# Patient Record
Sex: FEMALE | Race: WHITE
[De-identification: names, ages, dates, MRNs, and addresses within clinical notes are randomized per-mention and may not be internally consistent; named-entity substitution may affect disease eponyms.]

---

## 2018-06-01 ENCOUNTER — HOSPITAL ENCOUNTER (EMERGENCY)
Dept: HOSPITAL 47 - EC | Age: 43
Discharge: HOME | End: 2018-06-01
Payer: COMMERCIAL

## 2018-06-01 VITALS — RESPIRATION RATE: 16 BRPM | SYSTOLIC BLOOD PRESSURE: 126 MMHG | DIASTOLIC BLOOD PRESSURE: 78 MMHG

## 2018-06-01 VITALS — HEART RATE: 90 BPM | TEMPERATURE: 98 F

## 2018-06-01 DIAGNOSIS — T24.292A: ICD-10-CM

## 2018-06-01 DIAGNOSIS — Z87.891: ICD-10-CM

## 2018-06-01 DIAGNOSIS — Z88.5: ICD-10-CM

## 2018-06-01 DIAGNOSIS — Z88.8: ICD-10-CM

## 2018-06-01 DIAGNOSIS — T24.322A: Primary | ICD-10-CM

## 2018-06-01 DIAGNOSIS — Z23: ICD-10-CM

## 2018-06-01 DIAGNOSIS — Z88.0: ICD-10-CM
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
## 2018-06-01 PROCEDURE — 16020 DRESS/DEBRID P-THICK BURN S: CPT

## 2018-06-01 PROCEDURE — 90715 TDAP VACCINE 7 YRS/> IM: CPT

## 2018-06-01 PROCEDURE — 99283 EMERGENCY DEPT VISIT LOW MDM: CPT

## 2018-06-01 PROCEDURE — 90471 IMMUNIZATION ADMIN: CPT
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
## 2021-04-29 ENCOUNTER — HOSPITAL ENCOUNTER (EMERGENCY)
Dept: HOSPITAL 47 - EC | Age: 46
Discharge: HOME | End: 2021-04-29
Payer: COMMERCIAL

## 2021-04-29 VITALS — DIASTOLIC BLOOD PRESSURE: 74 MMHG | HEART RATE: 91 BPM | SYSTOLIC BLOOD PRESSURE: 109 MMHG

## 2021-04-29 VITALS — RESPIRATION RATE: 18 BRPM | TEMPERATURE: 98.6 F

## 2021-04-29 DIAGNOSIS — U07.1: Primary | ICD-10-CM

## 2021-04-29 DIAGNOSIS — F32.9: ICD-10-CM

## 2021-04-29 PROCEDURE — 99284 EMERGENCY DEPT VISIT MOD MDM: CPT

## 2021-04-29 PROCEDURE — 71046 X-RAY EXAM CHEST 2 VIEWS: CPT

## 2021-04-29 NOTE — ED
General Adult HPI





- General


Chief complaint: Shortness of Breath


Stated complaint: cough/SOB


Time Seen by Provider: 04/29/21 09:36


Source: patient, RN notes reviewed


Mode of arrival: ambulatory


Limitations: no limitations





- History of Present Illness


Initial comments: 


This a 45-year-old female presents emergency Department chief complaint of 

shortness of breath, COVID-19.  Patient states that she's been sick for 8-9 days

tested positive on Tuesday at [facility].  Patient states that symptoms are not

improving.  She's been taken Motrin for fever.  She denies any significant past 

no history denies history of being immunocompromised, lung disease.  Patient has

leg pain leg swelling no chest pain patient states she's had on-and-off fevers 

bodyaches mild congestion.








- Related Data


                                Home Medications











 Medication  Instructions  Recorded  Confirmed


 


Ibuprofen [Motrin Ib] 600 - 800 mg PO Q6H PRN 06/01/18 06/01/18








                                  Previous Rx's











 Medication  Instructions  Recorded


 


Cephalexin [Keflex] 500 mg PO Q6HR #40 cap 06/01/18


 


Ibuprofen [Motrin] 600 mg PO Q8HR PRN #30 tab 06/01/18


 


SILVER sulfADIAZINE Cream 1 applic TOPICAL BID #30 gram 06/01/18





[Silvadene 1% Cream]  


 


Dexamethasone [Decadron] 6 mg PO DAILY #5 tablet 04/29/21











                                    Allergies











Allergy/AdvReac Type Severity Reaction Status Date / Time


 


adhesive tape Allergy  Rash/Hives Verified 04/29/21 09:35


 


bacitracin Allergy  Rash/Hives Verified 04/29/21 09:35


 


amoxicillin AdvReac  Nausea & Verified 06/01/18 09:55





   Vomiting &  





   Diarrhea  


 


codeine AdvReac  Nausea & Verified 06/01/18 09:55





   Vomiting &  





   Diarrhea  


 


Opioids - Morphine Analogues AdvReac  Nausea & Verified 06/01/18 09:55





   Vomiting &  





   Diarrhea  














Review of Systems


ROS Statement: 


Those systems with pertinent positive or pertinent negative responses have been 

documented in the HPI.





ROS Other: All systems not noted in ROS Statement are negative.





Past Medical History


Past Medical History: No Reported History


History of Any Multi-Drug Resistant Organisms: None Reported


Past Surgical History: Hernia Repair


Past Psychological History: Depression


Smoking Status: Never smoker


Past Alcohol Use History: Occasional


Past Drug Use History: None Reported





General Exam


Limitations: no limitations


General appearance: alert, in no apparent distress


Head exam: Present: atraumatic, normocephalic, normal inspection


Eye exam: Present: normal appearance, PERRL, EOMI.  Absent: scleral icterus, 

conjunctival injection, periorbital swelling


ENT exam: Present: normal exam, normal oropharynx, mucous membranes moist


Neck exam: Present: normal inspection, full ROM.  Absent: tenderness, 

meningismus, lymphadenopathy


Respiratory exam: Present: normal lung sounds bilaterally.  Absent: respiratory 

distress, wheezes, rales, rhonchi, stridor


Cardiovascular Exam: Present: normal rhythm, tachycardia, normal heart sounds.  

Absent: systolic murmur, diastolic murmur, rubs, gallop, clicks


GI/Abdominal exam: Present: soft, normal bowel sounds.  Absent: distended, 

tenderness, guarding, rebound, rigid


Neurological exam: Present: alert


Skin exam: Present: warm, dry, intact, normal color.  Absent: rash





Course


                                   Vital Signs











  04/29/21 04/29/21





  09:31 09:56


 


Temperature 98.6 F 


 


Pulse Rate 106 H 


 


Respiratory 18 18





Rate  


 


Blood Pressure 110/73 


 


O2 Sat by Pulse 92 L 





Oximetry  














Medical Decision Making





- Medical Decision Making





X-ray shows bilateral patchy infiltrates patient was able to ambulate states 

that she felt much better states she's not been getting up moving much.  Patient

 states that she prefers to go home she was offered admission patient declines. 

 Return parameters were discussed.





Disposition


Clinical Impression: 


 COVID-19





Disposition: HOME SELF-CARE


Condition: Stable


Instructions (If sedation given, give patient instructions):  Coronavirus 

Disease 2019 (COVID-19)


Additional Instructions: 


Please return to the Emergency Department if symptoms worsen or any other 

concerns.


Prescriptions: 


Dexamethasone [Decadron] 6 mg PO DAILY #5 tablet


Is patient prescribed a controlled substance at d/c from ED?: No


Referrals: 


None,Stated [Primary Care Provider] - 1-2 days


Time of Disposition: 10:51

## 2021-04-29 NOTE — XR
EXAMINATION TYPE: XR chest 2V

 

DATE OF EXAM: 4/29/2021

 

COMPARISON: NONE

 

TECHNIQUE: PA and lateral views submitted.

 

HISTORY: Shortness of breath

 

FINDINGS:

There are patchy bilateral areas of infiltrate with tiny bilateral effusion. No pneumothorax. Scolios
is noted. Heart size normal. Mild hyperinflation.

 

IMPRESSION:

1. Patchy bilateral infiltrate correlate for pneumonia.

## 2021-11-22 ENCOUNTER — HOSPITAL ENCOUNTER (EMERGENCY)
Dept: HOSPITAL 47 - EC | Age: 46
Discharge: HOME | End: 2021-11-22
Payer: COMMERCIAL

## 2021-11-22 VITALS
RESPIRATION RATE: 18 BRPM | DIASTOLIC BLOOD PRESSURE: 96 MMHG | SYSTOLIC BLOOD PRESSURE: 131 MMHG | HEART RATE: 83 BPM | TEMPERATURE: 98.2 F

## 2021-11-22 DIAGNOSIS — S00.93XA: Primary | ICD-10-CM

## 2021-11-22 DIAGNOSIS — S06.0X0A: ICD-10-CM

## 2021-11-22 DIAGNOSIS — S02.5XXA: ICD-10-CM
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
## 2021-11-22 PROCEDURE — 72125 CT NECK SPINE W/O DYE: CPT

## 2021-11-22 PROCEDURE — 70486 CT MAXILLOFACIAL W/O DYE: CPT

## 2021-11-22 PROCEDURE — 70450 CT HEAD/BRAIN W/O DYE: CPT

## 2021-11-22 PROCEDURE — 99283 EMERGENCY DEPT VISIT LOW MDM: CPT
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
## 2022-10-04 ENCOUNTER — HOSPITAL ENCOUNTER (OUTPATIENT)
Dept: HOSPITAL 47 - RADMAMWWP | Age: 47
Discharge: HOME | End: 2022-10-04
Attending: OBSTETRICS & GYNECOLOGY
Payer: MEDICAID

## 2022-10-04 DIAGNOSIS — Z12.31: Primary | ICD-10-CM

## 2022-10-04 DIAGNOSIS — Z80.3: ICD-10-CM

## 2022-10-04 DIAGNOSIS — Z78.0: ICD-10-CM

## 2022-10-04 PROCEDURE — 77063 BREAST TOMOSYNTHESIS BI: CPT

## 2022-10-04 PROCEDURE — 77067 SCR MAMMO BI INCL CAD: CPT

## 2022-10-05 NOTE — MM
Reason for Exam: Screening  (asymptomatic). 

Last mammogram was performed 10 year(s) and 3 month(s) ago. 





Patient History: 

Menarche at age 14. First Full-Term Pregnancy at age 28. 2016, Bilateral Implants.

Paternal aunt had breast cancer, age 40. Paternal aunt had breast cancer, age 60. Paternal aunt had

breast cancer, age 67. Mother had breast cancer, age 45. 





Risk Values: 

Barbara 5 year model risk: 1.7%.

NCI Lifetime model risk: 17.8%.





Prior Study Comparison: 

7/31/2012 Bilateral Diagnostic Mammogram, [facility]. 





Tissue Density: 

The breast tissue is heterogeneously dense. This may lower the sensitivity of mammography.





Findings: 

Analyzed By CAD. 

Bilateral breast implants.

There is a mass anterior middle depth measuring 23 mm in the retroareolar region of the right

breast.

There is no left suspicious group of microcalcifications or new suspicious mass in either breast. 





Overall Assessment: Incomplete: need additional imaging evaluation, BI-RAD 0





Management: 

Diagnostic Breast Ultrasound of the right breast.

Diagnostic Mammogram of the right breast.

A clinical breast exam by your physician is recommended on an annual basis and results should be

correlated with mammographic findings.  [facility] will attempt to contact patient to

return for supplemental views and ultrasound if indicated.



Electronically signed and approved by: George Carter DO

## 2023-02-04 ENCOUNTER — HOSPITAL ENCOUNTER (EMERGENCY)
Dept: HOSPITAL 47 - EC | Age: 48
Discharge: HOME | End: 2023-02-04
Payer: MEDICAID

## 2023-02-04 VITALS — RESPIRATION RATE: 18 BRPM

## 2023-02-04 VITALS — HEART RATE: 100 BPM | DIASTOLIC BLOOD PRESSURE: 73 MMHG | SYSTOLIC BLOOD PRESSURE: 134 MMHG | TEMPERATURE: 98.1 F

## 2023-02-04 DIAGNOSIS — F32.A: ICD-10-CM

## 2023-02-04 DIAGNOSIS — Z91.048: ICD-10-CM

## 2023-02-04 DIAGNOSIS — Z88.1: ICD-10-CM

## 2023-02-04 DIAGNOSIS — S42.212A: Primary | ICD-10-CM

## 2023-02-04 DIAGNOSIS — Z88.5: ICD-10-CM

## 2023-02-04 DIAGNOSIS — Z88.8: ICD-10-CM
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
## 2023-02-04 PROCEDURE — 73030 X-RAY EXAM OF SHOULDER: CPT

## 2023-02-04 PROCEDURE — 73200 CT UPPER EXTREMITY W/O DYE: CPT

## 2023-02-04 PROCEDURE — 96375 TX/PRO/DX INJ NEW DRUG ADDON: CPT

## 2023-02-04 PROCEDURE — 73070 X-RAY EXAM OF ELBOW: CPT

## 2023-02-04 PROCEDURE — 96374 THER/PROPH/DIAG INJ IV PUSH: CPT

## 2023-02-04 PROCEDURE — 99284 EMERGENCY DEPT VISIT MOD MDM: CPT

## 2023-02-04 NOTE — XR
EXAMINATION TYPE: XR shoulder complete LT

 

DATE OF EXAM: 2/4/2023

 

COMPARISON: NONE

 

HISTORY: 4 views

 

TECHNIQUE: 4 views

 

FINDINGS: There is an acute transverse fracture of the left humeral neck. No dislocation. There is 6 
mm displacement. No focal bone destruction.

 

IMPRESSION: Acute fracture left humeral neck

## 2023-02-04 NOTE — XR
EXAMINATION TYPE: XR elbow limited LT

 

DATE OF EXAM: 2/4/2023

 

COMPARISON: NONE

 

HISTORY: Pain

 

TECHNIQUE: 2 views

 

FINDINGS: Elbow joint spaces are normal. No fracture nor dislocation. No evidence of joint effusion.

 

IMPRESSION: Negative left elbow exam.

## 2023-02-04 NOTE — ED
General Adult HPI





- General


Chief complaint: Extremity Injury, Upper


Stated complaint: Fall-L arm injury


Time Seen by Provider: 02/04/23 13:37


Source: patient, RN notes reviewed


Mode of arrival: ambulatory


Limitations: no limitations





- History of Present Illness


Initial comments: 


A 7-year-old  female with no significant past medical history presents 

to the emergency department with a chief complaint of left shoulder pain.  She 

reports that last thing she was getting out of her 's truck and missed 

the step and fell forward onto her left shoulder.  She reports worsening pain 

and oozing to her upper arm.  Previous injury.  She took Motrin last night with 

mild relief.  This morning she is having difficulty lifting her arm due to the p

ain.  She denies hitting her head, loss of consciousness, any anticoagulant use.

 She denies any numbness or tingling





- Related Data


                                Home Medications











 Medication  Instructions  Recorded  Confirmed


 


Ibuprofen [Motrin Ib] 600 mg PO Q6H PRN 06/01/18 04/29/21


 


Etonogestrel/Ethinyl Estradiol 1 ring VG AS DIRECTED 04/29/21 04/29/21





[Nuvaring Vaginal Ring]   








                                  Previous Rx's











 Medication  Instructions  Recorded


 


dexAMETHasone [Decadron] 6 mg PO DAILY #5 tablet 04/29/21


 


HYDROcodone/APAP 5-325MG [Norco 5] 1 each PO Q6HR PRN #12 tab 02/04/23


 


traMADol HCl [Ultram] 50 mg PO Q6H PRN #20 tab 02/04/23











                                    Allergies











Allergy/AdvReac Type Severity Reaction Status Date / Time


 


adhesive tape Allergy  Rash/Hives Verified 02/04/23 13:35


 


bacitracin Allergy  Rash/Hives Verified 02/04/23 13:35


 


amoxicillin AdvReac  Nausea & Verified 02/04/23 13:35





   Vomiting &  





   Diarrhea  


 


codeine AdvReac  Nausea & Verified 02/04/23 13:35





   Vomiting &  





   Diarrhea  


 


Opioids - Morphine Analogues AdvReac  Nausea & Verified 02/04/23 13:35





   Vomiting &  





   Diarrhea  














Review of Systems


ROS Statement: 


Those systems with pertinent positive or pertinent negative responses have been 

documented in the HPI.





ROS Other: All systems not noted in ROS Statement are negative.





Past Medical History


Past Medical History: No Reported History


History of Any Multi-Drug Resistant Organisms: None Reported


Past Surgical History: Hernia Repair


Past Psychological History: Depression


Smoking Status: Never smoker


Past Alcohol Use History: Occasional


Past Drug Use History: None Reported





General Exam


Limitations: no limitations


General appearance: alert, in no apparent distress


Head exam: Present: atraumatic, normocephalic, normal inspection


Eye exam: Present: normal appearance, PERRL, EOMI.  Absent: scleral icterus, 

conjunctival injection, periorbital swelling


ENT exam: Present: normal exam, mucous membranes moist


Neck exam: Present: normal inspection.  Absent: tenderness, meningismus, 

lymphadenopathy


Respiratory exam: Present: normal lung sounds bilaterally.  Absent: respiratory 

distress, wheezes, rales, rhonchi, stridor


Cardiovascular Exam: Present: regular rate, normal rhythm, normal heart sounds. 

 Absent: systolic murmur, diastolic murmur, rubs, gallop, clicks


GI/Abdominal exam: Present: soft, normal bowel sounds.  Absent: distended, 

tenderness, guarding, rebound, rigid


Extremities exam: Present: normal inspection, full ROM, normal capillary refill.

  Absent: tenderness, pedal edema, joint swelling, calf tenderness


  ** Left


Shoulder Exam: Present: tenderness, swelling, ecchymosis.  Absent: normal 

inspection, full ROM (secondary to pain), deformity, crepitus


Upper Arm exam: Present: tenderness, swelling, ecchymosis (Medial axilla region 

).  Absent: normal inspection, full ROM, deformity, crepidus


Elbow exam: Present: normal inspection, full ROM, tenderness, swelling.  Absent:

 deformity, crepitus


Forearm Wrist exam: Present: normal inspection, full ROM.  Absent: tenderness, 

swelling


Back exam: Present: normal inspection


Neurological exam: Present: alert, oriented X3, CN II-XII intact


Psychiatric exam: Present: normal affect, normal mood


Skin exam: Present: warm, dry, intact, normal color.  Absent: rash





Course


                                   Vital Signs











  02/04/23 02/04/23





  13:33 15:36


 


Temperature 98.5 F 98.1 F


 


Pulse Rate 99 100


 


Respiratory 18 18





Rate  


 


Blood Pressure 127/83 134/73


 


O2 Sat by Pulse 98 100





Oximetry  














- Reevaluation(s)


Reevaluation #1: 





02/04/23 14:22


Case discussed with Dr. Resendiz who reviewed XR and recommends CT L shoulder








Reevaluation #2: 





02/04/23 15:30


CT results discussed with Dr. Resendiz, orthopedist on call who recommends's 

placing patient in a sling and follow-up with her on Monday





Medical Decision Making





- Medical Decision Making





Was pt. sent in by a medical professional or institution (HERBER Carballo, NP, urgent 

care, hospital, or nursing home...) When possible be specific


@  -[No]


Did you speak to anyone other than the patient for history (EMS, parent, family,

 police, friend...)? What history was obtained from this source 


@  -[No]


Did you review nursing and triage notes (agree or disagree)?  Why? 


@  -[I reviewed and agree with nursing and triage notes]


Were old charts reviewed (outside hosp., previous admission, EMS record, old 

EKG, old radiological studies, urgent care reports/EKG's, nursing home records)?

Report findings 


@  -[No old charts were reviewed]


Differential Diagnosis (chest pain, altered mental status, abdominal pain women,

abdominal pain men, vaginal bleeding, weakness, fever, dyspnea, syncope, 

headache, dizziness, GI bleed, back pain, seizure, CVA, palpatations, mental 

health)? 


@  -[not applicable]


EKG interpreted by me (3pts min.).


@  -[As above]


X-rays interpreted by me (1pt min.).


@  -Acute transverse fracture of the left humeral neck no dislocation there is 6

mm displacement


CT interpreted by me (1pt min.).


@  -CT left shoulder with acute comminuted humeral neck fracture no dislocation 

with subcutaneous edema and bruising


U/S interpreted by me (1pt. min.).


@  -[None done]


What testing was considered but not performed or refused? (CT, X-rays, U/S, 

labs)? Why?


@  -[None]


What meds were considered but not given or refused? Why?


@  -[None]


Did you discuss the management of the patient with other professionals 

(professionals i.e. HERBER Carballo, NP, lab, RT, psych nurse, , , 

teacher, , )? Give summary


@  - Case discussed with Dr. Resendiz, orthopedist on call who reviewed patient 

imaging results and recommends placing the patient in a waiting with close 

follow-up on Monday morning at his office.


Was smoking cessation discussed for >3mins.?


@  -[No]


Was critical care preformed (if so, how long)?


@  -[No]


Were there social determinants of health that impacted care today? How? 

(Homelessness, low income, unemployed, alcoholism, drug addiction, 

transportation, low edu. Level, literacy, decrease access to med. care, longterm, 

rehab)?


@  -[No]


Was there de-escalation of care discussed even if they declined (Discuss DNR or 

withdrawal of care, Hospice)? DNR status


@  -[No]


What co-morbidities impacted this encounter? (DM, HTN, Smoking, COPD, CAD, 

Cancer, CVA, ARF, Chemo, Hep., AIDS, mental health diagnosis, sleep apnea, 

morbid obesity)?


@  -[None]


Was patient admitted / discharged? Hospital course, mention meds given and 

route, prescriptions, significant lab abnormalities, going to OR and other 

pertinent info.


@  ---47 year-old  female to the emergency department with L shoulder 

pain. Patient had a history and physical performed.  Physical exam reveals 

swollen, ecchymotic L upper arm with decreased ROM secondary to pain, distal NVI

 remains intacts. X-rays reveals humeral neck fx..  sHe should was given 

Toradol, morphine, zofran with symptomatic relief on the emergency department.  

I discussed the results in Detail with the patient, patient verbalized 

understanding all questions were addressed.  She was given a prescription for 

Norco 5's and tramadol and placed in a shoulder immobilizer. Patient was 

encouraged to follow up with her primary care in 1-2 days.  Return precautions 

were discussed.  The patient was discharged in stable condition.  I discussed 

the case with CYNTHIA Fowler who agrees with the plan of care


Undiagnosed new problem with uncertain prognosis?


@  -[No]


Drug Therapy requiring intensive monitoring for toxicity (Heparin, Nitro, 

Insulin, Cardizem)?


@  -[No]


Were any procedures done?


@  -[No]


Diagnosis/symptom?


@  -Left humeral neck fracture 


Acute, or Chronic, or Acute on Chronic?


@  -acute 


Uncomplicated (without systemic symptoms) or Complicated (systemic symptoms)?


@  -uncomplicated 


Side effects of treatment?


@  -[No]


Exacerbation, Progression, or Severe Exacerbation?


@  -[No]


Poses a threat to life or bodily function? How? (Chest pain, USA, MI, pneumonia,

 PE, COPD, DKA, ARF, appy, cholecystitis, CVA, Diverticulitis, Homicidal, 

Suicidal, threat to staff... and all critical care pts)


@  -low likelihood





Disposition


Clinical Impression: 


 Humeral surgical neck fracture





Disposition: HOME SELF-CARE


Condition: Stable


Instructions (If sedation given, give patient instructions):  Arm Fracture in 

Adults (ED)


Additional Instructions: 


Return to the nearest emergency department if symptoms worsen or persist.


Prescriptions: 


HYDROcodone/APAP 5-325MG [Norco 5] 1 each PO Q6HR PRN #12 tab


 PRN Reason: Pain


traMADol HCl [Ultram] 50 mg PO Q6H PRN #20 tab


 PRN Reason: Pain


Is patient prescribed a controlled substance at d/c from ED?: Yes


When asked, does pt state using other controlled substances?: No


If prescribed controlled substance>3 days was MAPS reviewed?: Prescribed <3 Days


If opioid is for acute pain is fill amount 7 days or less?: No


If Rx opioid, was Start Talking consent form obtained?: No


Referrals: 


None,Stated [Primary Care Provider] - 1-2 days


Bill Resendiz DO [Doctor of Osteopathic Medicine] - 1-2 days


Time of Disposition: 15:32

## 2023-02-04 NOTE — CT
EXAMINATION TYPE: CT shoulder LT wo con

 

DATE OF EXAM: 2/4/2023

 

COMPARISON: None

 

HISTORY: Left humeral fx

 

CT DLP: 370.7 mGycm

Automated exposure control for dose reduction was used.

 

Images obtained from the top of the shoulder to the mid humerus with no contrast.

 

There is acute proximal humerus fracture involving the neck. There is comminution. No significant dis
placement of the major fragments. No focal bone destruction. No evidence of a soft tissue mass. There
 is some subcutaneous edema anterior to the shoulder and consistent with bruising. Muscle bundles eva
ear intact. There is slight anterior angulation at the fracture site. The AC joint is intact. The sca
pula appears intact. Left upper ribs are intact.

 

IMPRESSION:

Acute comminuted humeral neck fracture. No dislocation. Subcutaneous edema and bruising.

## 2024-08-21 ENCOUNTER — HOSPITAL ENCOUNTER (OUTPATIENT)
Dept: HOSPITAL 47 - RADMAMWWP | Age: 49
Discharge: HOME | End: 2024-08-21
Attending: OBSTETRICS & GYNECOLOGY
Payer: MEDICAID

## 2024-08-21 DIAGNOSIS — Z80.3: ICD-10-CM

## 2024-08-21 DIAGNOSIS — Z12.31: Primary | ICD-10-CM

## 2024-08-21 DIAGNOSIS — Z78.0: ICD-10-CM

## 2024-08-21 PROCEDURE — 77067 SCR MAMMO BI INCL CAD: CPT

## 2024-08-21 PROCEDURE — 77063 BREAST TOMOSYNTHESIS BI: CPT

## 2024-09-09 NOTE — MM
Reason for Exam: Hx of breast augmentation, asymptomatic. 

Last mammogram was performed 1 year(s) and 10 month(s) ago. 





Patient History: 

Menarche at age 14. First Full-Term Pregnancy at age 28. Postmenopausal. 2016, Bilateral Implants.

Paternal aunt had breast cancer, age 40. Paternal aunt had breast cancer, age 60. Paternal aunt had

breast cancer, age 67. Mother had breast cancer, age 45. 





Risk Values: 

Barbara 5 year model risk: 1.8%.

NCI Lifetime model risk: 17.3%.





Prior Study Comparison: 

7/31/2012 Bilateral Diagnostic Mammogram, [facility]. 10/4/2022 Bilateral MG 3D screen mammo imp/cad., [facility].







Tissue Density: 

The breasts are heterogeneously dense, which may obscure small masses.





Findings: 

Analyzed By CAD. 

Bilateral retropectoral silicone implants.



Enlarging 3 cm partially obscured mass central, subareolar right breast for which further ultrasound

evaluation is recommended.



Asymmetric density superior left MLO view is more defined and incompletely disperses on 3-D images.

This may represent superimposition shadow but further evaluation is recommended. 





Overall Assessment: Incomplete: need additional imaging evaluation, BI-RAD 0





Management: 

Diagnostic Breast Ultrasound of the right breast.

Special View Mammogram of the left breast.

[facility] will attempt to contact patient to return for supplemental views and

ultrasound if indicated.



Electronically signed and approved by: Carline Ross M.D. Radiologist

## 2024-09-16 ENCOUNTER — HOSPITAL ENCOUNTER (OUTPATIENT)
Dept: HOSPITAL 47 - RADMAMWWP | Age: 49
Discharge: HOME | End: 2024-09-16
Attending: OBSTETRICS & GYNECOLOGY
Payer: MEDICAID

## 2024-09-16 PROCEDURE — 77061 BREAST TOMOSYNTHESIS UNI: CPT

## 2024-09-16 PROCEDURE — 77065 DX MAMMO INCL CAD UNI: CPT

## 2024-09-16 NOTE — MM
Reason for Exam: Additional evaluation requested from abnormal screening. 

Last screening mammogram was performed less than 1 month ago.





Patient History: 

Menarche at age 14. First Full-Term Pregnancy at age 28. Postmenopausal. 2016, Bilateral Implants.

Paternal aunt had breast cancer, age 40. Paternal aunt had breast cancer, age 60. Paternal aunt had

breast cancer, age 67. Mother had breast cancer, age 45. 





Risk Values: 

Barbara 5 year model risk: 1.8%.

NCI Lifetime model risk: 17.3%.





Prior Study Comparison: 

7/31/2012 Bilateral Diagnostic Mammogram, [facility]. 10/4/2022 Bilateral MG 3D screen mammo imp/cad., [facility].

8/21/2024 Bilateral MG 3D screen mammo imp/cad., [facility]. 





Tissue Density: 

Left: There are scattered areas of fibroglandular density.





Findings: 

Analyzed By CAD. 

Area of increased density is within the subareolar right breast. Ultrasound is recommended for

additional workup.



There is asymmetric density in the upper left breast. This disperses on compression. No discrete

abnormality identified on the mediolateral view. Short-term follow-up of the left breast is

recommended in 6 months. 





Overall Assessment: Incomplete: need additional imaging evaluation, BI-RAD 0





Management: 

Diagnostic Breast Ultrasound of the right breast.

Diagnostic Mammogram of the left breast in 6 months.

A negative mammogram report should not preclude additional follow up of suspicious palpable

abnormalities.

Patient should continue monthly self breast exam.

A clinical breast exam by your physician is recommended on an annual basis and results should be

correlated with mammographic findings.



Note on Barbara scores and lifetime risk:

1. A Barbara score greater than 3% is considered moderate risk. If this is the case, consider

specialist referral to assess eligibility for a risk reducing agent.

2. If overall lifetime risk for the development of breast cancer is 20% or higher, the patient may

qualify for future screening with alternating mammogram and breast MRI.



[facility], Workstation: [id number], 9/16/2024 2:34 PM.



Electronically signed and approved by: Mohsen Russ D.O. Radiologis

## 2024-09-16 NOTE — USB
Reason for Exam: Additional evaluation requested from abnormal screening. 





Patient History: 

Menarche at age 14. First Full-Term Pregnancy at age 28. Postmenopausal. 2016, Bilateral Implants.

Paternal aunt had breast cancer, age 40. Paternal aunt had breast cancer, age 60. Paternal aunt had

breast cancer, age 67. Mother had breast cancer, age 45. 





Risk Values: 

Barbara 5 year model risk: 1.8%.

NCI Lifetime model risk: 17.3%.

Technique: 

Method: Targeted.  





Prior Study Comparison: 

7/31/2012 Bilateral Diagnostic Mammogram, [facility]. 10/4/2022 Bilateral MG 3D screen mammo imp/cad., [facility].

8/21/2024 Bilateral MG 3D screen mammo imp/cad., [facility]. 





Findings: 

The axilla of the right breast and the retroareolar of the right breast were scanned.

There is a 3.0 x 3.1 x 1.3 cm complex cyst with a few scattered internal echoes from debris. This is

at the 12:00 position 1 cm from the nipple correlates with the mammogram. This has enlarged from the

comparison 2022. The debris appears to be new from comparison. Cyst aspiration may be warranted. 





Overall Assessment: Suspicious, BI-RAD 4





Management: 

Aspiration of the right breast.

A clinical breast exam by your physician is recommended on an annual basis and results should be

correlated with mammographic findings.  This exam should not preclude additional follow-up of

suspicious palpable abnormalities. Results were given to the patient verbally at the time of exam.



[facility], Workstation: [id number], 9/16/2024 2:10 PM.



Electronically signed and approved by: Mohsen Russ D.O. Radiologis

## 2024-10-01 ENCOUNTER — HOSPITAL ENCOUNTER (OUTPATIENT)
Dept: HOSPITAL 47 - RADUSWWP | Age: 49
End: 2024-10-01
Attending: SURGERY
Payer: MEDICAID

## 2024-10-01 DIAGNOSIS — N60.81: Primary | ICD-10-CM

## 2024-10-01 PROCEDURE — 88305 TISSUE EXAM BY PATHOLOGIST: CPT

## 2024-10-01 PROCEDURE — 76942 ECHO GUIDE FOR BIOPSY: CPT

## 2024-10-01 PROCEDURE — 88173 CYTOPATH EVAL FNA REPORT: CPT

## 2024-10-07 NOTE — USB
Risk Values: 

Barbara 5 year model risk: 1.8%.

NCI Lifetime model risk: 17.3%.





Prior Study Comparison: 

10/4/2022 Bilateral MG 3D screen mammo imp/cad., [facility]. 8/21/2024 Bilateral MG 3D screen mammo

imp/cad., [facility]. 9/16/2024 Left MG 3D work up w/cad w/imp , [facility]. 

Pathology Description: 

Location: 12 o'clock.

The ultrasound guided cyst aspiration procedure was explained to the patient. The risks, benefits,

alternatives were discussed. An informed consent was then obtained.  A time out was performed



The patient was placed in supine positioning for imaging and for the procedure. The overlying skin

was prepped with betadine and sterilely draped in usual sterile fashion.  10 ml 1% lidocaine was

used as anesthetic into the skin and deeper breast tissue up to area of concern right breast 12:00

position 1 cm from the nipple.



Under ultrasound guidance, an 12-gauge spinal needle was advanced into the cyst and aspiration

yielded 2 mL of blood tinged fluid.  The fluid was labeled and sent for laboratory analysis.  A

clip was left in lesion. Good hemostasis was obtained with direct pressure.



Postprocedure mammogram: The patient was transferred to mammography for physician ordered post

procedure mammogram for clip placement verification. The clip is in the expected region of the

biopsy. The patient tolerated the procedure well without any immediate complication. The patient was

discharged to home in stable condition.



Impression:  Successful ultrasound guided cyst aspiration right breast. Cytology pending.















[facility], Workstation: [id number], 10/1/2024 2:29 PM. 





Pathology Results: 

Result: Benign, Fibrocystic change.

Pathology and radiology were reviewed. Findings are concordant.



RIGHT BREAST, ASPIRATION:  Cyst fluid of fibrocystic change having ductal cells with apocrine

metaplasia.  Negative for diagnostic cytologic malignancy. 





Overall Assessment: Benign





Management: 

Diagnostic Breast Ultrasound of the right breast in 6 months.

Electronically signed and approved by: Leopold M. Fregoli, M.D. Radiologis